# Patient Record
Sex: MALE | Race: WHITE | NOT HISPANIC OR LATINO | URBAN - METROPOLITAN AREA
[De-identification: names, ages, dates, MRNs, and addresses within clinical notes are randomized per-mention and may not be internally consistent; named-entity substitution may affect disease eponyms.]

---

## 2019-08-28 ENCOUNTER — APPOINTMENT (RX ONLY)
Dept: URBAN - METROPOLITAN AREA CLINIC 84 | Facility: CLINIC | Age: 54
Setting detail: DERMATOLOGY
End: 2019-08-28

## 2019-08-28 DIAGNOSIS — L23.9 ALLERGIC CONTACT DERMATITIS, UNSPECIFIED CAUSE: ICD-10-CM

## 2019-08-28 PROBLEM — I10 ESSENTIAL (PRIMARY) HYPERTENSION: Status: ACTIVE | Noted: 2019-08-28

## 2019-08-28 PROCEDURE — 99202 OFFICE O/P NEW SF 15 MIN: CPT

## 2019-08-28 PROCEDURE — ? PHOTO-DOCUMENTATION

## 2019-08-28 PROCEDURE — ? COUNSELING

## 2019-08-28 PROCEDURE — ? DIAGNOSIS COMMENT

## 2019-08-28 ASSESSMENT — LOCATION DETAILED DESCRIPTION DERM: LOCATION DETAILED: DORSAL GLANS

## 2019-08-28 ASSESSMENT — LOCATION SIMPLE DESCRIPTION DERM: LOCATION SIMPLE: PENIS

## 2019-08-28 ASSESSMENT — LOCATION ZONE DERM: LOCATION ZONE: PENIS

## 2019-09-06 ENCOUNTER — APPOINTMENT (RX ONLY)
Dept: URBAN - METROPOLITAN AREA CLINIC 84 | Facility: CLINIC | Age: 54
Setting detail: DERMATOLOGY
End: 2019-09-06

## 2019-09-06 DIAGNOSIS — L259 CONTACT DERMATITIS AND OTHER ECZEMA, UNSPECIFIED CAUSE: ICD-10-CM

## 2019-09-06 PROBLEM — L23.9 ALLERGIC CONTACT DERMATITIS, UNSPECIFIED CAUSE: Status: ACTIVE | Noted: 2019-09-06

## 2019-09-06 PROCEDURE — ? PRESCRIPTION MEDICATION MANAGEMENT

## 2019-09-06 PROCEDURE — ? PRESCRIPTION

## 2019-09-06 PROCEDURE — ? COUNSELING

## 2019-09-06 PROCEDURE — 99212 OFFICE O/P EST SF 10 MIN: CPT

## 2019-09-06 RX ORDER — HYDROCORTISONE VALERATE 2 MG/G
CREAM TOPICAL
Qty: 1 | Refills: 2 | Status: ERX | COMMUNITY
Start: 2019-09-06

## 2019-09-06 RX ADMIN — HYDROCORTISONE VALERATE: 2 CREAM TOPICAL at 14:57

## 2019-09-06 ASSESSMENT — LOCATION SIMPLE DESCRIPTION DERM: LOCATION SIMPLE: PENIS

## 2019-09-06 ASSESSMENT — LOCATION DETAILED DESCRIPTION DERM: LOCATION DETAILED: DORSAL GLANS PENIS

## 2019-09-06 ASSESSMENT — LOCATION ZONE DERM: LOCATION ZONE: PENIS

## 2019-09-06 NOTE — PROCEDURE: PRESCRIPTION MEDICATION MANAGEMENT
Plan: Patient advised to have Allergy Test done
Detail Level: Zone
Otc Regimen: Bath with Dove for sensitive skin
Render In Strict Bullet Format?: No

## 2019-09-16 ENCOUNTER — APPOINTMENT (RX ONLY)
Dept: URBAN - METROPOLITAN AREA CLINIC 84 | Facility: CLINIC | Age: 54
Setting detail: DERMATOLOGY
End: 2019-09-16

## 2019-09-16 DIAGNOSIS — L259 CONTACT DERMATITIS AND OTHER ECZEMA, UNSPECIFIED CAUSE: ICD-10-CM

## 2019-09-16 PROBLEM — L23.9 ALLERGIC CONTACT DERMATITIS, UNSPECIFIED CAUSE: Status: ACTIVE | Noted: 2019-09-16

## 2019-09-16 PROCEDURE — 95044 PATCH/APPLICATION TESTS: CPT

## 2019-09-16 PROCEDURE — ? PATCH TESTING

## 2019-09-16 PROCEDURE — ? COUNSELING

## 2019-09-16 ASSESSMENT — LOCATION DETAILED DESCRIPTION DERM: LOCATION DETAILED: EPIGASTRIC SKIN

## 2019-09-16 ASSESSMENT — LOCATION ZONE DERM: LOCATION ZONE: TRUNK

## 2019-09-16 ASSESSMENT — LOCATION SIMPLE DESCRIPTION DERM: LOCATION SIMPLE: ABDOMEN

## 2019-09-18 ENCOUNTER — APPOINTMENT (RX ONLY)
Dept: URBAN - METROPOLITAN AREA CLINIC 84 | Facility: CLINIC | Age: 54
Setting detail: DERMATOLOGY
End: 2019-09-18

## 2019-09-18 DIAGNOSIS — Z48.817 ENCOUNTER FOR SURGICAL AFTERCARE FOLLOWING SURGERY ON THE SKIN AND SUBCUTANEOUS TISSUE: ICD-10-CM

## 2019-09-18 PROCEDURE — ? POST-OP WOUND CHECK

## 2019-09-18 PROCEDURE — 99024 POSTOP FOLLOW-UP VISIT: CPT

## 2019-09-18 ASSESSMENT — LOCATION ZONE DERM: LOCATION ZONE: TRUNK

## 2019-09-18 ASSESSMENT — LOCATION SIMPLE DESCRIPTION DERM: LOCATION SIMPLE: LEFT UPPER BACK

## 2019-09-18 ASSESSMENT — LOCATION DETAILED DESCRIPTION DERM: LOCATION DETAILED: LEFT INFERIOR MEDIAL UPPER BACK

## 2019-09-18 NOTE — PROCEDURE: POST-OP WOUND CHECK
Detail Level: Detailed
Add 25826 Cpt? (Important Note: In 2017 The Use Of 87234 Is Being Tracked By Cms To Determine Future Global Period Reimbursement For Global Periods): yes

## 2019-09-20 ENCOUNTER — APPOINTMENT (RX ONLY)
Dept: URBAN - METROPOLITAN AREA CLINIC 84 | Facility: CLINIC | Age: 54
Setting detail: DERMATOLOGY
End: 2019-09-20

## 2019-09-20 DIAGNOSIS — L259 CONTACT DERMATITIS AND OTHER ECZEMA, UNSPECIFIED CAUSE: ICD-10-CM

## 2019-09-20 PROBLEM — L23.9 ALLERGIC CONTACT DERMATITIS, UNSPECIFIED CAUSE: Status: ACTIVE | Noted: 2019-09-20

## 2019-09-20 PROCEDURE — ? COUNSELING

## 2019-09-20 PROCEDURE — ? PRESCRIPTION MEDICATION MANAGEMENT

## 2019-09-20 PROCEDURE — ? NORTH AMERICAN 80 PATCH TEST READING

## 2019-09-20 PROCEDURE — 99213 OFFICE O/P EST LOW 20 MIN: CPT

## 2019-09-20 NOTE — PROCEDURE: [employer] 80 PATCH TEST READING
Allergen 2 Reaction: no reaction
Show Negative Results In The Note?: No
Name Of Allergen 19: Myroxylon pereirae resin
Name Of Allergen 57: Cananga odorata oil
Name Of Allergen 21: Diazolidinyl Urea
Name Of Allergen 20: Nickel (II) sulfate hexahydrate
What Reading Time Point?: 24 hour
Name Of Allergen 34: Benzophenone-3
Name Of Allergen 18: Potassium dichromate
Name Of Allergen 79: propylene glycol
Name Of Allergen 51: Disperse yellow 3
Name Of Allergen 61: Desoximetasone
Name Of Allergen 27: Methyldibromo Glutaronitrile
Name Of Allergen 23: Bacitracin
Detail Level: Zone
Name Of Allergen 16: Mercapto mix
Name Of Allergen 45: Budesonide
Name Of Allergen 31: Sesquiterpene lactone mix
Name Of Allergen 42: Methyl mehacrylate
Name Of Allergen 60: tricosan
Name Of Allergen 8: Carba Mix
Name Of Allergen 74: Hydrperoxides of linalool
Name Of Allergen 10: Thiuram mix
Name Of Allergen 54: Methylisothiazolinone
Name Of Allergen 67: Lidocaine
Name Of Allergen 38: Gold (I) sodium thiosulfate dihydrate
Name Of Allergen 15: 8-list-Femhsrdwlrykjfrojwjazqg
Name Of Allergen 37: 2-tert-butyl-4-methoxyphenol (BHA)
Name Of Allergen 46: Cocamide KRIS
Name Of Allergen 35: Chloroxylenol (PCMX)
Name Of Allergen 44: tixocortol-21-pivalate
Name Of Allergen 73: ethylhexyl salicylate
Name Of Allergen 64: 2-n-octyl-4-isothiazolin-3-one
Name Of Allergen 77: Formaldehyde
Name Of Allergen 68: Fusidic acid sodium salt
Name Of Allergen 76: Cocamidopropyl betaine
Name Of Allergen 25: Disperse orange 3
Name Of Allergen 39: Ethyl acrylate
Name Of Allergen 3: Colophonium
Name Of Allergen 36: Ethyleneurea, melamine formaldehyde mix
Name Of Allergen 69: Dibucaine hydrochloride
Name Of Allergen 63: Iodopropynyl Butylcarbamate
Name Of Allergen 65: Disperse blue mix 106/124
Name Of Allergen 22: Tocopherol
Name Of Allergen 29: Glutaral
Name Of Allergen 59: isopropyl myristate
Number Of Patches Read: 1
Name Of Allergen 7: Amerchol L-101
Name Of Allergen 58: Benzyl alcohol
Name Of Allergen 4: p-Phenylenediamine
Name Of Allergen 14: Quaternium-15
Name Of Allergen 52: Benzyl salicylate
Name Of Allergen 28: Fragrance mix 1
Name Of Allergen 32: Thimerosal
Name Of Allergen 5: Imidazolidinyl
Name Of Allergen 43: cobalt (II) chloride hexahydrate
Name Of Allergen 72: Hydroxyisohexyl 3-cyclohexene carboxaldehyde
Name Of Allergen 24: Mixed Dialkyl thiourea
Name Of Allergen 6: Cinnamal
Name Of Allergen 71: Isoamyl p-methoxycinnamate
Name Of Allergen 50: Fragrance mix II
Name Of Allergen 62: Polysorbate
Name Of Allergen 26: Paraben mix
Name Of Allergen 70: Benzoyl peroxide
Name Of Allergen 12: Ethylenediamine dihydrochloride
Name Of Allergen 53: Decyl Glucoside
Name Of Allergen 1: Benzocaine
Name Of Allergen 56: DMDM Hydantoin
Name Of Allergen 13: Epoxy resin, Bisphenol A
Name Of Allergen 33: Propolis
Name Of Allergen 75: Amidoamine
Name Of Allergen 55: 2-hydroxyethyl methacrylate
Name Of Allergen 48: Hydrocortisone-17-butyrate
Name Of Allergen 41: Toluenesulfonamide formaldehyde resin
Name Of Allergen 17: N-isopropyl-N-phenyl-4-phenylenediamine
Name Of Allergen 47: Triethanolamine
Name Of Allergen 78: Methyliosthiazolinone + methylchloroisothiazolinone
Name Of Allergen 66: Compositae mix II
Name Of Allergen 30: 2-bromo-2nitropropane-1, 3-Diol
Name Of Allergen 2: 2-Mercaptobenzothiazole (MBT)
Allergen 74 Reaction: 1+
Name Of Allergen 9: Neomycin sulfate
Name Of Allergen 80: Dimethylol dihydroxy ethylene urea
Name Of Allergen 11: Clobetasol-17-proprionate
Name Of Allergen 40: Glyceryl Thoiglycolate
Name Of Allergen 49: Tea tree oil oxidized